# Patient Record
Sex: MALE | Race: WHITE | NOT HISPANIC OR LATINO | URBAN - METROPOLITAN AREA
[De-identification: names, ages, dates, MRNs, and addresses within clinical notes are randomized per-mention and may not be internally consistent; named-entity substitution may affect disease eponyms.]

---

## 2019-01-26 ENCOUNTER — EMERGENCY (EMERGENCY)
Facility: HOSPITAL | Age: 59
LOS: 1 days | Discharge: HOME | End: 2019-01-26
Attending: EMERGENCY MEDICINE
Payer: COMMERCIAL

## 2019-01-26 VITALS
TEMPERATURE: 98 F | SYSTOLIC BLOOD PRESSURE: 173 MMHG | DIASTOLIC BLOOD PRESSURE: 97 MMHG | WEIGHT: 225.09 LBS | HEIGHT: 70 IN | RESPIRATION RATE: 23 BRPM | HEART RATE: 76 BPM | OXYGEN SATURATION: 98 %

## 2019-01-26 LAB
ALBUMIN SERPL ELPH-MCNC: 4.8 G/DL — SIGNIFICANT CHANGE UP (ref 3.3–5.2)
ALP SERPL-CCNC: 98 U/L — SIGNIFICANT CHANGE UP (ref 40–120)
ALT FLD-CCNC: 52 U/L — HIGH
ANION GAP SERPL CALC-SCNC: 15 MMOL/L — SIGNIFICANT CHANGE UP (ref 5–17)
AST SERPL-CCNC: 31 U/L — SIGNIFICANT CHANGE UP
BASOPHILS # BLD AUTO: 0.1 K/UL — SIGNIFICANT CHANGE UP (ref 0–0.2)
BASOPHILS NFR BLD AUTO: 0.8 % — SIGNIFICANT CHANGE UP (ref 0–2)
BILIRUB SERPL-MCNC: 0.7 MG/DL — SIGNIFICANT CHANGE UP (ref 0.4–2)
BUN SERPL-MCNC: 23 MG/DL — HIGH (ref 8–20)
CALCIUM SERPL-MCNC: 9.8 MG/DL — SIGNIFICANT CHANGE UP (ref 8.6–10.2)
CHLORIDE SERPL-SCNC: 98 MMOL/L — SIGNIFICANT CHANGE UP (ref 98–107)
CO2 SERPL-SCNC: 27 MMOL/L — SIGNIFICANT CHANGE UP (ref 22–29)
CREAT SERPL-MCNC: 0.99 MG/DL — SIGNIFICANT CHANGE UP (ref 0.5–1.3)
EOSINOPHIL # BLD AUTO: 0.2 K/UL — SIGNIFICANT CHANGE UP (ref 0–0.5)
EOSINOPHIL NFR BLD AUTO: 1.6 % — SIGNIFICANT CHANGE UP (ref 0–5)
GLUCOSE SERPL-MCNC: 92 MG/DL — SIGNIFICANT CHANGE UP (ref 70–115)
HCT VFR BLD CALC: 48.1 % — SIGNIFICANT CHANGE UP (ref 42–52)
HGB BLD-MCNC: 16.5 G/DL — SIGNIFICANT CHANGE UP (ref 14–18)
LYMPHOCYTES # BLD AUTO: 3.7 K/UL — SIGNIFICANT CHANGE UP (ref 1–4.8)
LYMPHOCYTES # BLD AUTO: 39 % — SIGNIFICANT CHANGE UP (ref 20–55)
MCHC RBC-ENTMCNC: 30.8 PG — SIGNIFICANT CHANGE UP (ref 27–31)
MCHC RBC-ENTMCNC: 34.3 G/DL — SIGNIFICANT CHANGE UP (ref 32–36)
MCV RBC AUTO: 89.9 FL — SIGNIFICANT CHANGE UP (ref 80–94)
MONOCYTES # BLD AUTO: 0.9 K/UL — HIGH (ref 0–0.8)
MONOCYTES NFR BLD AUTO: 9.4 % — SIGNIFICANT CHANGE UP (ref 3–10)
NEUTROPHILS # BLD AUTO: 4.6 K/UL — SIGNIFICANT CHANGE UP (ref 1.8–8)
NEUTROPHILS NFR BLD AUTO: 49 % — SIGNIFICANT CHANGE UP (ref 37–73)
PLATELET # BLD AUTO: 278 K/UL — SIGNIFICANT CHANGE UP (ref 150–400)
POTASSIUM SERPL-MCNC: 3.9 MMOL/L — SIGNIFICANT CHANGE UP (ref 3.5–5.3)
POTASSIUM SERPL-SCNC: 3.9 MMOL/L — SIGNIFICANT CHANGE UP (ref 3.5–5.3)
PROT SERPL-MCNC: 8.3 G/DL — SIGNIFICANT CHANGE UP (ref 6.6–8.7)
RBC # BLD: 5.35 M/UL — SIGNIFICANT CHANGE UP (ref 4.6–6.2)
RBC # FLD: 13.2 % — SIGNIFICANT CHANGE UP (ref 11–15.6)
SODIUM SERPL-SCNC: 140 MMOL/L — SIGNIFICANT CHANGE UP (ref 135–145)
TROPONIN T SERPL-MCNC: <0.01 NG/ML — SIGNIFICANT CHANGE UP (ref 0–0.06)
WBC # BLD: 9.4 K/UL — SIGNIFICANT CHANGE UP (ref 4.8–10.8)
WBC # FLD AUTO: 9.4 K/UL — SIGNIFICANT CHANGE UP (ref 4.8–10.8)

## 2019-01-26 PROCEDURE — 99285 EMERGENCY DEPT VISIT HI MDM: CPT | Mod: 25

## 2019-01-26 PROCEDURE — 93010 ELECTROCARDIOGRAM REPORT: CPT

## 2019-01-26 PROCEDURE — 71046 X-RAY EXAM CHEST 2 VIEWS: CPT | Mod: 26

## 2019-01-26 RX ORDER — HYDRALAZINE HCL 50 MG
10 TABLET ORAL EVERY 6 HOURS
Qty: 0 | Refills: 0 | Status: DISCONTINUED | OUTPATIENT
Start: 2019-01-26 | End: 2019-01-31

## 2019-01-26 RX ORDER — LOSARTAN POTASSIUM 100 MG/1
100 TABLET, FILM COATED ORAL DAILY
Qty: 0 | Refills: 0 | Status: DISCONTINUED | OUTPATIENT
Start: 2019-01-26 | End: 2019-01-31

## 2019-01-26 RX ORDER — AMLODIPINE BESYLATE 2.5 MG/1
5 TABLET ORAL DAILY
Qty: 0 | Refills: 0 | Status: DISCONTINUED | OUTPATIENT
Start: 2019-01-26 | End: 2019-01-31

## 2019-01-26 RX ORDER — ATORVASTATIN CALCIUM 80 MG/1
20 TABLET, FILM COATED ORAL AT BEDTIME
Qty: 0 | Refills: 0 | Status: DISCONTINUED | OUTPATIENT
Start: 2019-01-26 | End: 2019-01-31

## 2019-01-26 RX ORDER — ASPIRIN/CALCIUM CARB/MAGNESIUM 324 MG
81 TABLET ORAL DAILY
Qty: 0 | Refills: 0 | Status: DISCONTINUED | OUTPATIENT
Start: 2019-01-26 | End: 2019-01-31

## 2019-01-26 RX ORDER — CYCLOBENZAPRINE HYDROCHLORIDE 10 MG/1
10 TABLET, FILM COATED ORAL ONCE
Qty: 0 | Refills: 0 | Status: COMPLETED | OUTPATIENT
Start: 2019-01-26 | End: 2019-01-26

## 2019-01-26 RX ORDER — KETOROLAC TROMETHAMINE 30 MG/ML
30 SYRINGE (ML) INJECTION ONCE
Qty: 0 | Refills: 0 | Status: DISCONTINUED | OUTPATIENT
Start: 2019-01-26 | End: 2019-01-26

## 2019-01-26 RX ADMIN — CYCLOBENZAPRINE HYDROCHLORIDE 10 MILLIGRAM(S): 10 TABLET, FILM COATED ORAL at 21:51

## 2019-01-26 RX ADMIN — Medication 30 MILLIGRAM(S): at 22:06

## 2019-01-26 RX ADMIN — Medication 30 MILLIGRAM(S): at 21:51

## 2019-01-26 NOTE — ED PROVIDER NOTE - NS ED ROS FT
no weight change, no fever +chills  no recent travel, no recent abox, no sick contacts  no recent change in medications  no rash, no bruises  no visual changes no eye discharge  no cough cold or congestion,   +sob, +chest pain  follows with cardiology  +stress test, no cath  no orthopnea, no pnd  no abd pain, no n/v/d  no endoscopy, +colonoscopy  no hematuria, no change in urinary habits  no joint pain, no deformity  no headache, no paresthesia

## 2019-01-26 NOTE — CONSULT NOTE ADULT - SUBJECTIVE AND OBJECTIVE BOX
Consult requested by: dr Mccormack	     Reason for Consultation: Chest Pain    History obtained by: Patient and medical record     obtained: No    Chief complaint: chest pain      HPI: 59 y/o male presents to the Ed with a c/o sudden onset of sharp 9/10 lower left chest wall pain that is positional, intermitting, and reproducible with palpitation and movement. He denies any history of this pain in the past. he admits to chills during the pain but denies SOB, nausea, sweats, fatigue. He came to the ED because his father had an AMI with PCI at this age. He is followed by Dr Carrillo cardiologist in [place]. His medical history is HTN, HLD and he states he had a negative stress test in April or May of 2018. He also believes his EKG was not normal.            REVIEW OF SYSTEMS:  CONSTITUTIONAL: No fever, weight loss, or fatigue  EYES: No eye pain, visual disturbances, or discharge  ENMT:  No difficulty hearing, tinnitus, vertigo; No sinus or throat pain  NECK: No pain or stiffness  BREASTS: No pain, masses, or nipple discharge  RESPIRATORY: No cough, wheezing, + chills No hemoptysis; No shortness of breath  CARDIOVASCULAR: + chest pain, No palpitations, dizziness, or leg swelling  GASTROINTESTINAL: No abdominal or epigastric pain. No nausea, vomiting, or hematemesis; No diarrhea or constipation. No melena or hematochezia.  GENITOURINARY: No dysuria, frequency, hematuria, or incontinence  NEUROLOGICAL: No headaches, memory loss, loss of strength, numbness, or tremors  SKIN: No itching, burning, rashes, or lesions   LYMPH NODES: No enlarged glands  ENDOCRINE: No heat or cold intolerance; No hair loss  MUSCULOSKELETAL: No joint pain or swelling; No muscle, back, or extremity pain  PSYCHIATRIC: No depression, anxiety, mood swings, or difficulty sleeping  HEME/LYMPH: No easy bruising, or bleeding gums  ALLERY AND IMMUNOLOGIC: No hives or eczema    MEDICATIONS  (STANDING):  Amlodipine 5 mg QD  aspirin 81 mg QD  Losartan 100 mg QD  Pravastatin 80 mg QD    MEDICATIONS  (PRN):        PAST MEDICAL & SURGICAL HISTORY:  High cholesterol  HTN (hypertension)      FAMILY HISTORY:  CAD with PCI ICD      SOCIAL HISTORY:  CIGARETTES: current smoker 6 cigars a month     ALCOHOL: 1-2 drinks daily   DRUGS: Denies    Vital Signs Last 24 Hrs  T(C): 36.4 (26 Jan 2019 19:30), Max: 36.4 (26 Jan 2019 19:30)  T(F): 97.6 (26 Jan 2019 19:30), Max: 97.6 (26 Jan 2019 19:30)  HR: 76 (26 Jan 2019 19:31) (76 - 76)  BP: 173/97 (26 Jan 2019 19:31) (173/97 - 173/97)  BP(mean): --  RR: 30 (26 Jan 2019 19:31) (23 - 30)  SpO2: 99% (26 Jan 2019 19:31) (98% - 99%)    PHYSICAL EXAM:  GENERAL: NAD, well-groomed, well-developed  HEAD:  Atraumatic, Normocephalic  EYES: EOMI, PERRLA, conjunctiva and sclera clear  ENMT: No tonsillar erythema, exudates, or enlargement; Moist mucous membranes, Good dentition, No lesions  NECK: Supple, No JVD, Normal thyroid  NERVOUS SYSTEM:  Alert & Oriented X3, Good concentration; Motor Strength 5/5 B/L upper and lower extremities;   CHEST/LUNG: Clear to percussion bilaterally; No rales, rhonchi, wheezing, or rubs, + tenderness left lower ribs mid clavicula line   HEART: Regular rate and rhythm; No murmurs, rubs, or gallops  ABDOMEN: Soft, Nontender, Nondistended; Bowel sounds present  EXTREMITIES:  2+ Peripheral Pulses, No clubbing, cyanosis, or edema  LYMPH: No lymphadenopathy noted  SKIN: No rashes or lesions        INTERPRETATION OF TELEMETRY:     ECG: NSR rate 71 T wave inversion V1, poor R wave in lead III, non-specific T wave changes no acute ischemic changes      I&O's Detail      LABS:                        16.5   9.4   )-----------( 278      ( 26 Jan 2019 19:48 )             48.1     01-26    140  |  98  |  23.0<H>  ----------------------------<  92  3.9   |  27.0  |  0.99    Ca    9.8      26 Jan 2019 19:48  Phos  3.9     01-26    TPro  8.3  /  Alb  4.8  /  TBili  0.7  /  DBili  x   /  AST  31  /  ALT  52<H>  /  AlkPhos  98  01-26    CARDIAC MARKERS ( 26 Jan 2019 19:48 )  Trop <0.01 ng/mL   U/L   CLMB 3.8 ng/mL          I&O's Summary      RADIOLOGY & ADDITIONAL STUDIES:  X-ray:  no acute process    PREVIOUS DIAGNOSTIC TESTING:  None available      ECHO  STRESS    CATHETERIZATION       Assessment / Plan: Consult requested by: dr Mccormack	     Reason for Consultation: Chest Pain    History obtained by: Patient and medical record     obtained: No    Chief complaint: chest pain      HPI: 59 y/o male presents to the Ed with a c/o sudden onset of sharp 9/10 lower left chest wall pain that is positional, intermitting, and reproducible with palpitation and movement. He denies any history of this pain in the past. he admits to chills during the pain but denies SOB, nausea, sweats, fatigue. He came to the ED because his father had an AMI with PCI at this age. He is followed by Dr Carrillo cardiologist in [place]. His medical history is HTN, HLD and he states he had a negative stress test in April or May of 2018. He also believes his EKG was not normal. Patient admits to a viral Upper respiratory infection last week.           REVIEW OF SYSTEMS:  CONSTITUTIONAL: No fever, weight loss, or fatigue  EYES: No eye pain, visual disturbances, or discharge  ENMT:  No difficulty hearing, tinnitus, vertigo; No sinus or throat pain  NECK: No pain or stiffness  BREASTS: No pain, masses, or nipple discharge  RESPIRATORY: No cough, wheezing, + chills No hemoptysis; No shortness of breath  CARDIOVASCULAR: + chest pain, No palpitations, dizziness, or leg swelling  GASTROINTESTINAL: No abdominal or epigastric pain. No nausea, vomiting, or hematemesis; No diarrhea or constipation. No melena or hematochezia.  GENITOURINARY: No dysuria, frequency, hematuria, or incontinence  NEUROLOGICAL: No headaches, memory loss, loss of strength, numbness, or tremors  SKIN: No itching, burning, rashes, or lesions   LYMPH NODES: No enlarged glands  ENDOCRINE: No heat or cold intolerance; No hair loss  MUSCULOSKELETAL: No joint pain or swelling; No muscle, back, or extremity pain  PSYCHIATRIC: No depression, anxiety, mood swings, or difficulty sleeping  HEME/LYMPH: No easy bruising, or bleeding gums  ALLERY AND IMMUNOLOGIC: No hives or eczema    MEDICATIONS  (STANDING):  Amlodipine 5 mg QD  aspirin 81 mg QD  Losartan 100 mg QD  Pravastatin 80 mg QD    MEDICATIONS  (PRN):        PAST MEDICAL & SURGICAL HISTORY:  High cholesterol  HTN (hypertension)      FAMILY HISTORY:  CAD with PCI ICD      SOCIAL HISTORY:  CIGARETTES: current smoker 6 cigars a month     ALCOHOL: 1-2 drinks daily   DRUGS: Denies    Vital Signs Last 24 Hrs  T(C): 36.4 (26 Jan 2019 19:30), Max: 36.4 (26 Jan 2019 19:30)  T(F): 97.6 (26 Jan 2019 19:30), Max: 97.6 (26 Jan 2019 19:30)  HR: 76 (26 Jan 2019 19:31) (76 - 76)  BP: 173/97 (26 Jan 2019 19:31) (173/97 - 173/97)  BP(mean): --  RR: 30 (26 Jan 2019 19:31) (23 - 30)  SpO2: 99% (26 Jan 2019 19:31) (98% - 99%)    PHYSICAL EXAM:  GENERAL: NAD, well-groomed, well-developed  HEAD:  Atraumatic, Normocephalic  EYES: EOMI, PERRLA, conjunctiva and sclera clear  ENMT: No tonsillar erythema, exudates, or enlargement; Moist mucous membranes, Good dentition, No lesions  NECK: Supple, No JVD, Normal thyroid  NERVOUS SYSTEM:  Alert & Oriented X3, Good concentration; Motor Strength 5/5 B/L upper and lower extremities;   CHEST/LUNG: Clear to percussion bilaterally; No rales, rhonchi, wheezing, or rubs, + tenderness left lower ribs mid clavicula line   HEART: Regular rate and rhythm; No murmurs, rubs, or gallops  ABDOMEN: Soft, Nontender, Nondistended; Bowel sounds present  EXTREMITIES:  2+ Peripheral Pulses, No clubbing, cyanosis, or edema  LYMPH: No lymphadenopathy noted  SKIN: No rashes or lesions        INTERPRETATION OF TELEMETRY:     ECG: NSR rate 71 T wave inversion V1, poor R wave in lead III, non-specific T wave changes no acute ischemic changes      I&O's Detail      LABS:                        16.5   9.4   )-----------( 278      ( 26 Jan 2019 19:48 )             48.1     01-26    140  |  98  |  23.0<H>  ----------------------------<  92  3.9   |  27.0  |  0.99    Ca    9.8      26 Jan 2019 19:48  Phos  3.9     01-26    TPro  8.3  /  Alb  4.8  /  TBili  0.7  /  DBili  x   /  AST  31  /  ALT  52<H>  /  AlkPhos  98  01-26    CARDIAC MARKERS ( 26 Jan 2019 19:48 )  Trop <0.01 ng/mL   U/L   CLMB 3.8 ng/mL          I&O's Summary      RADIOLOGY & ADDITIONAL STUDIES:  X-ray:  no acute process    PREVIOUS DIAGNOSTIC TESTING:  None available      ECHO  STRESS    CATHETERIZATION       Assessment / Plan:

## 2019-01-26 NOTE — ED PROVIDER NOTE - MEDICAL DECISION MAKING DETAILS
59 y/o M with hx of HLD and HTN presents with L anterior chest wall pain acute, plan to do labs, CXR, pain control and re-evaluate.

## 2019-01-26 NOTE — CONSULT NOTE ADULT - ATTENDING COMMENTS
57 y/o male with HTN, DLD, with Atypical CP followed by  Dr Carrillo cardiologist in [place].           1) Atypical chest pain: R/O AMI in setting of multiple risk factors, smoking, HLD, HTN, family Hx, ETOH use, abnormal EKG                                  serial CE and EKG, CE (-) x 3  currently CP free                                Recc: C/W outpatient meds f/u with Primary cardiologist for outpt stress /echo

## 2019-01-26 NOTE — ED CDU PROVIDER INITIAL DAY NOTE - MEDICAL DECISION MAKING DETAILS
57 y/o M with hx of HLD and HTN presents with L anterior chest wall pain acute, plan to do labs, CXR, pain control and re-evaluate.

## 2019-01-26 NOTE — ED CDU PROVIDER INITIAL DAY NOTE - PHYSICAL EXAMINATION
Constitutional: Appears uncomfortable with movement, talking in full sentences  Head: NC/AT, no swelling  Eyes: EOMI, no swelling  Mouth: mm moist  Neck: supple, trachea is midline  Chest: Bilateral air entry, symmetrical chest expansion, no distress, able to exaggerate pain between 6th and 7th rib at L mid clavicular line, no swelling or zoster noted  Heart: S1 S2 distant  Abdomen: abd soft, non tender  Musc/Skel: extremities with no swelling, no deformity, no spine tenderness, distal pulses present  Neuro: A&Ox3, no focal deficits

## 2019-01-26 NOTE — ED ADULT NURSE NOTE - OBJECTIVE STATEMENT
Patient A&Ox4 complaining of sudden onset of chest pain, left sided while eating dinner this evening. Denies any radiation, describes as tightness. Wife stated patient doubled over & started yelling, which she stated is not usual for him. Code team 1 at bedside.

## 2019-01-26 NOTE — ED ADULT TRIAGE NOTE - CHIEF COMPLAINT QUOTE
pt a+ox3, dropped off outside ambulance triage by wife stating "I am having a heart attack." pt clutching chest and tachypneic @ 30. pt brought to critical care, code team called to bedside.

## 2019-01-26 NOTE — ED PROVIDER NOTE - PHYSICAL EXAMINATION
Constitutional: Appears comfortably, talking in full sentences  Head: NC/AT, no swelling  Eyes: EOMI, no swelling  Mouth: mm moist  Neck: supple, trachea is midline  Chest: Bilateral air entry, symmetrical chest expansion, no distress  Heart: S1 S2 distant  Abdomen: abd soft, non tender  Musc/Skel: extremities with no swelling, no deformity, no spine tenderness, distal pulses present  Neuro: A&Ox3, no focal deficits Constitutional: Appears uncomfortable with movement, talking in full sentences  Head: NC/AT, no swelling  Eyes: EOMI, no swelling  Mouth: mm moist  Neck: supple, trachea is midline  Chest: Bilateral air entry, symmetrical chest expansion, no distress, able to exaggerate pain between 6th and 7th rib at L mid clavicular line, no swelling or zoster noted  Heart: S1 S2 distant  Abdomen: abd soft, non tender  Musc/Skel: extremities with no swelling, no deformity, no spine tenderness, distal pulses present  Neuro: A&Ox3, no focal deficits

## 2019-01-26 NOTE — ED ADULT NURSE NOTE - CAS EDN DISCHARGE ASSESSMENT
Alert and oriented to person, place and time/Awake/Symptoms improved/Dressing clean and dry/Patient baseline mental status

## 2019-01-26 NOTE — CONSULT NOTE ADULT - ASSESSMENT
Assessment / Plan:  59 y/o male presents to the Ed with a c/o sudden onset of sharp 9/10 lower left chest wall pain that is positional, intermitting, and reproducible with palpitation and movement. He denies any history of this pain in the past. he admits to chills during the pain but denies SOB, nausea, sweats, fatigue. He came to the ED because his father had an AMI with PCI at this age. He is followed by Dr Carrillo cardiologist in [place]. His medical history is HTN, HLD and he states he had a negative stress test in April or May of 2018. He also believes his EKG was not normal.            1) atypical chest pain: R/O AMI in setting of multiple risk factors, smoking, HLD, HTN, family Hx, ETOH use, abnormal EKG                                  serial CE and EKG, first CE negative                                  TTE in Am                                  C/W Aspirin 81 mg QD  2) HLD: C/W outpatient meds f/u with Primary cardiologist    3) HTN: not well controlled in the Ed Assessment / Plan:  57 y/o male presents to the Ed with a c/o sudden onset of sharp 9/10 lower left chest wall pain that is positional, intermitting, and reproducible with palpitation and movement. He denies any history of this pain in the past. he admits to chills during the pain but denies SOB, nausea, sweats, fatigue. He came to the ED because his father had an AMI with PCI at this age. He is followed by Dr Carrillo cardiologist in [place]. His medical history is HTN, HLD and he states he had a negative stress test in April or May of 2018. He also believes his EKG was not normal.            1) atypical chest pain: R/O AMI in setting of multiple risk factors, smoking, HLD, HTN, family Hx, ETOH use, abnormal EKG                                  serial CE and EKG, first CE negative                                  TTE in Am                                  C/W Aspirin 81 mg QD  2) HLD: C/W outpatient meds f/u with Primary cardiologist    3) HTN: not well controlled in the Ed c/w out patient meds and monitor BP                                    Hydralazine 10 mg IVP PRN SBP > 160

## 2019-01-26 NOTE — ED ADULT NURSE NOTE - NSIMPLEMENTINTERV_GEN_ALL_ED
Implemented All Universal Safety Interventions:  [place] to call system. Call bell, personal items and telephone within reach. Instruct patient to call for assistance. Room bathroom lighting operational. Non-slip footwear when patient is off stretcher. Physically safe environment: no spills, clutter or unnecessary equipment. Stretcher in lowest position, wheels locked, appropriate side rails in place.

## 2019-01-26 NOTE — ED PROVIDER NOTE - OBJECTIVE STATEMENT
57 y/o M hx of HTN, HLD presents to the ED c/o constant chest pain onset today. Pt states sx began after finishing dinner this evening, described as tightness and exacerbated with certain movements and coughing. When pain is present, pt feels short of breath and chills. Pt has never experienced similar episode of sx in the past. Pt is compliant with all medications. Denies fever, chills, nausea/vomiting. Pt works as strong and . Negative stress test in the past. FHx of father with MI at 55 y/o.   PMD is Dr. Montalvo, Cardiologist is Dr. Carrillo

## 2019-01-26 NOTE — ED CDU PROVIDER INITIAL DAY NOTE - PROGRESS NOTE DETAILS
PA Note: PT evaluated by physician. HPI/PE/ROS as noted above. Will follow up plan per physician and cardiology consult.  Pt asymptomatic at this time, pt reports CP improved after administration of Flexeril and Toradol. Will repeat Troponin/EKG and TTE in the AM.

## 2019-01-27 VITALS
OXYGEN SATURATION: 95 % | HEART RATE: 60 BPM | TEMPERATURE: 98 F | SYSTOLIC BLOOD PRESSURE: 125 MMHG | RESPIRATION RATE: 17 BRPM | DIASTOLIC BLOOD PRESSURE: 79 MMHG

## 2019-01-27 LAB
CK SERPL-CCNC: 127 U/L — SIGNIFICANT CHANGE UP (ref 30–200)
PHOSPHATE SERPL-MCNC: 4.6 MG/DL — SIGNIFICANT CHANGE UP (ref 2.4–4.7)
TROPONIN T SERPL-MCNC: <0.01 NG/ML — SIGNIFICANT CHANGE UP (ref 0–0.06)

## 2019-01-27 PROCEDURE — 84100 ASSAY OF PHOSPHORUS: CPT

## 2019-01-27 PROCEDURE — 71046 X-RAY EXAM CHEST 2 VIEWS: CPT

## 2019-01-27 PROCEDURE — 93010 ELECTROCARDIOGRAM REPORT: CPT | Mod: 76

## 2019-01-27 PROCEDURE — 93005 ELECTROCARDIOGRAM TRACING: CPT

## 2019-01-27 PROCEDURE — 96374 THER/PROPH/DIAG INJ IV PUSH: CPT

## 2019-01-27 PROCEDURE — 99220: CPT

## 2019-01-27 PROCEDURE — 82550 ASSAY OF CK (CPK): CPT

## 2019-01-27 PROCEDURE — 99284 EMERGENCY DEPT VISIT MOD MDM: CPT | Mod: 25

## 2019-01-27 PROCEDURE — 84484 ASSAY OF TROPONIN QUANT: CPT

## 2019-01-27 PROCEDURE — 85027 COMPLETE CBC AUTOMATED: CPT

## 2019-01-27 PROCEDURE — 82553 CREATINE MB FRACTION: CPT

## 2019-01-27 PROCEDURE — G0378: CPT

## 2019-01-27 PROCEDURE — 36415 COLL VENOUS BLD VENIPUNCTURE: CPT

## 2019-01-27 PROCEDURE — 80053 COMPREHEN METABOLIC PANEL: CPT

## 2019-01-27 RX ADMIN — Medication 81 MILLIGRAM(S): at 07:42

## 2019-01-27 RX ADMIN — AMLODIPINE BESYLATE 5 MILLIGRAM(S): 2.5 TABLET ORAL at 07:42

## 2019-01-27 RX ADMIN — LOSARTAN POTASSIUM 100 MILLIGRAM(S): 100 TABLET, FILM COATED ORAL at 07:42

## 2019-01-27 NOTE — ED CDU PROVIDER DISPOSITION NOTE - CARE PROVIDER_API CALL
Agustin Walker (MD; MPH), Cardiac Electrophysiology; Cardiovascular Disease; Internal Medicine  [address] [place] [address] [id number]  Phone: [phone]  Fax: [phone]

## 2019-01-27 NOTE — ED CDU PROVIDER SUBSEQUENT DAY NOTE - HISTORY
59yo male with hx of HTN and HLD presents for chest pain. Pt has no complaints at this time, pt reports chest pain has subsided after medication administration. Pt denies fever/chills, nausea/vomiting, abdominal pain. 59 y/o M hx of HTN, HLD presents to the ED c/o constant chest pain onset today. Pt states sx began after finishing dinner this evening, described as tightness and exacerbated with certain movements and coughing. When pain is present, pt feels short of breath and chills. Pt has never experienced similar episode of sx in the past. Pt is compliant with all medications. Denies fever, chills, nausea/vomiting. Pt works as strong and . Negative stress test in the past. FHx of father with MI at 57 y/o.

## 2019-01-27 NOTE — ED CDU PROVIDER DISPOSITION NOTE - ATTENDING CONTRIBUTION TO CARE
Patient seen in obs  asymptomatic  cleared by cardiology  no need for NIRMALA  patient will be discharged form obs  Agree with assessment hx and physical

## 2019-01-27 NOTE — ED ADULT NURSE REASSESSMENT NOTE - NS ED NURSE REASSESS COMMENT FT1
Pt alert and oriented. resting in stretcher, no signs of distress noted. Handoff report given to Sevne Recio RN and pt's safety maintained. RN with no questions or concerns at this time
Purposeful rounding completed on patient. pt resting in stretcher in stretcher with no complaints of chest pain, SOB or dizziness.  VSS and documented as per flow sheet. plan care of care reviewed. Bed in lowest position, call bell within reach, and safety maintained. monitoring on-going for any changes.
assumed pt care from previous Rn Rosaura Lindo.  pt transported to CDU-10 for observation. pt  A&Ox3;  resting in stretcher, with no complaints of pain or discomfort. B/L lungs clear, normal s1&s2 heard on ausculation. (+) pedal pulses, skin warm/dry intact. IV patent to right Antecubital. VSS and documented as per flow sheet. plan of care reviewed and pt verbalizes understanding. bed in lowest position, call bell within reach and safety maintained. monitoring ongoing for any changes.
verbal report rec'd from CDU JORGE LUIS Luna, assumed care of pt, no obvious distress noted
Report given to Obs RN kmeal Montgomery transported to CDU10 and placed on telebox with  by this RN. Safety maintained, call bell in reach, Receiving rn bedside

## 2019-01-27 NOTE — ED CDU PROVIDER DISPOSITION NOTE - CARE PROVIDERS DIRECT ADDRESSES
[email or web address]

## 2019-01-27 NOTE — ED CDU PROVIDER DISPOSITION NOTE - CLINICAL COURSE
Patient with CP x 1 day underneath breast reproducible described it initially as stabbing, with risk factors HTN, HLDA, smoking, reports "100% improved", during ED obs course had troponins x 3 negative, EKG shows sinus wilma.  Patient has outpatient cardiologist that he follows up with annually MD Severino states had outpatient echo and stress test done within the year that were "normal."  Cardiac evaluation appreciated, to f/u with cardiologist as outpatient no indication for NIRMALA, cancelled studied as per MD Agustin Walker.

## 2019-01-27 NOTE — ED CDU PROVIDER SUBSEQUENT DAY NOTE - MEDICAL DECISION MAKING DETAILS
59yo male comes in complaining of chest pain. Per cardiology consult, serial troponins/EKG. TTE scheduled for the AM.

## 2019-01-28 RX ORDER — LOSARTAN POTASSIUM 100 MG/1
0 TABLET, FILM COATED ORAL
Qty: 0 | Refills: 0 | COMMUNITY

## 2019-01-28 RX ORDER — AMLODIPINE BESYLATE 2.5 MG/1
1 TABLET ORAL
Qty: 0 | Refills: 0 | COMMUNITY

## 2019-01-28 RX ORDER — AMLODIPINE BESYLATE 2.5 MG/1
0 TABLET ORAL
Qty: 0 | Refills: 0 | COMMUNITY

## 2019-01-28 RX ORDER — LOSARTAN POTASSIUM 100 MG/1
1 TABLET, FILM COATED ORAL
Qty: 0 | Refills: 0 | COMMUNITY

## 2019-01-28 RX ORDER — ASPIRIN/CALCIUM CARB/MAGNESIUM 324 MG
1 TABLET ORAL
Qty: 0 | Refills: 0 | COMMUNITY

## 2019-01-28 NOTE — ED POST DISCHARGE NOTE - DETAILS
Left message Spoke to pt, informed pt of his CXR results, pt requesting report be faxed to Dr. Ty Vázquez MD. Called office for fax #, [phone]

## 2020-01-15 PROBLEM — Z00.00 ENCOUNTER FOR PREVENTIVE HEALTH EXAMINATION: Status: ACTIVE | Noted: 2020-01-15

## 2020-01-16 PROBLEM — I10 ESSENTIAL (PRIMARY) HYPERTENSION: Chronic | Status: ACTIVE | Noted: 2019-01-26

## 2020-01-16 PROBLEM — E78.00 PURE HYPERCHOLESTEROLEMIA, UNSPECIFIED: Chronic | Status: ACTIVE | Noted: 2019-01-26

## 2020-04-08 ENCOUNTER — APPOINTMENT (OUTPATIENT)
Dept: INTERNAL MEDICINE | Facility: CLINIC | Age: 60
End: 2020-04-08

## 2020-04-15 ENCOUNTER — APPOINTMENT (OUTPATIENT)
Dept: INTERNAL MEDICINE | Facility: CLINIC | Age: 60
End: 2020-04-15
Payer: COMMERCIAL

## 2020-04-15 ENCOUNTER — NON-APPOINTMENT (OUTPATIENT)
Age: 60
End: 2020-04-15

## 2020-04-15 ENCOUNTER — LABORATORY RESULT (OUTPATIENT)
Age: 60
End: 2020-04-15

## 2020-04-15 VITALS
BODY MASS INDEX: 33.21 KG/M2 | TEMPERATURE: 98.4 F | HEIGHT: 70 IN | WEIGHT: 232 LBS | SYSTOLIC BLOOD PRESSURE: 122 MMHG | DIASTOLIC BLOOD PRESSURE: 80 MMHG | OXYGEN SATURATION: 98 % | HEART RATE: 74 BPM

## 2020-04-15 DIAGNOSIS — Z78.9 OTHER SPECIFIED HEALTH STATUS: ICD-10-CM

## 2020-04-15 DIAGNOSIS — F10.10 ALCOHOL ABUSE, UNCOMPLICATED: ICD-10-CM

## 2020-04-15 DIAGNOSIS — Z82.49 FAMILY HISTORY OF ISCHEMIC HEART DISEASE AND OTHER DISEASES OF THE CIRCULATORY SYSTEM: ICD-10-CM

## 2020-04-15 PROCEDURE — 36415 COLL VENOUS BLD VENIPUNCTURE: CPT

## 2020-04-15 PROCEDURE — 93000 ELECTROCARDIOGRAM COMPLETE: CPT

## 2020-04-15 PROCEDURE — 82043 UR ALBUMIN QUANTITATIVE: CPT | Mod: QW

## 2020-04-15 PROCEDURE — 99204 OFFICE O/P NEW MOD 45 MIN: CPT | Mod: 25

## 2020-04-15 RX ORDER — LOSARTAN POTASSIUM 100 MG/1
100 TABLET, FILM COATED ORAL
Qty: 90 | Refills: 0 | Status: ACTIVE | COMMUNITY
Start: 2020-03-21

## 2020-04-15 RX ORDER — PRAVASTATIN SODIUM 80 MG/1
80 TABLET ORAL
Qty: 90 | Refills: 0 | Status: ACTIVE | COMMUNITY
Start: 2019-06-14

## 2020-04-15 RX ORDER — HYDROCHLOROTHIAZIDE 12.5 MG/1
12.5 CAPSULE ORAL DAILY
Qty: 30 | Refills: 2 | Status: ACTIVE | COMMUNITY
Start: 2020-04-15 | End: 1900-01-01

## 2020-04-15 RX ORDER — AMLODIPINE BESYLATE 5 MG/1
5 TABLET ORAL
Qty: 90 | Refills: 0 | Status: ACTIVE | COMMUNITY
Start: 2019-06-14

## 2020-04-17 LAB
24R-OH-CALCIDIOL SERPL-MCNC: 27.5 PG/ML
ALBUMIN SERPL ELPH-MCNC: 4.6 G/DL
ALBUMIN: 150
ALP BLD-CCNC: 89 U/L
ALT SERPL-CCNC: 44 U/L
ANION GAP SERPL CALC-SCNC: 16 MMOL/L
APPEARANCE: CLEAR
AST SERPL-CCNC: 33 U/L
BASOPHILS # BLD AUTO: 0.08 K/UL
BASOPHILS NFR BLD AUTO: 1.1 %
BILIRUB SERPL-MCNC: 1.8 MG/DL
BILIRUBIN URINE: NEGATIVE
BLOOD URINE: NORMAL
BUN SERPL-MCNC: 22 MG/DL
CALCIUM SERPL-MCNC: 10.4 MG/DL
CHLORIDE SERPL-SCNC: 96 MMOL/L
CHOLEST SERPL-MCNC: 185 MG/DL
CHOLEST/HDLC SERPL: 3.3 RATIO
CK SERPL-CCNC: 218 U/L
CO2 SERPL-SCNC: 27 MMOL/L
COLOR: YELLOW
CREAT SERPL-MCNC: 1.24 MG/DL
CREATININE: 300
EOSINOPHIL # BLD AUTO: 0.08 K/UL
EOSINOPHIL NFR BLD AUTO: 1.1 %
ESTIMATED AVERAGE GLUCOSE: 111 MG/DL
GLUCOSE QUALITATIVE U: NEGATIVE
GLUCOSE SERPL-MCNC: 89 MG/DL
HBA1C MFR BLD HPLC: 5.5 %
HCT VFR BLD CALC: 49.9 %
HCV AB SER QL: NONREACTIVE
HCV S/CO RATIO: 0.29 S/CO
HDLC SERPL-MCNC: 57 MG/DL
HGB BLD-MCNC: 16.6 G/DL
HIV1+2 AB SPEC QL IA.RAPID: NONREACTIVE
IMM GRANULOCYTES NFR BLD AUTO: 0.3 %
KETONES URINE: ABNORMAL
LDLC SERPL CALC-MCNC: 111 MG/DL
LEUKOCYTE ESTERASE URINE: NEGATIVE
LYMPHOCYTES # BLD AUTO: 2.5 K/UL
LYMPHOCYTES NFR BLD AUTO: 35.4 %
MAN DIFF?: NORMAL
MCHC RBC-ENTMCNC: 30.8 PG
MCHC RBC-ENTMCNC: 33.3 GM/DL
MCV RBC AUTO: 92.6 FL
MICROALBUMIN/CREAT UR TEST STR-RTO: NORMAL
MONOCYTES # BLD AUTO: 0.66 K/UL
MONOCYTES NFR BLD AUTO: 9.3 %
NEUTROPHILS # BLD AUTO: 3.73 K/UL
NEUTROPHILS NFR BLD AUTO: 52.8 %
NITRITE URINE: NEGATIVE
PH URINE: 5.5
PLATELET # BLD AUTO: 314 K/UL
POTASSIUM SERPL-SCNC: 4.6 MMOL/L
PROT SERPL-MCNC: 7.5 G/DL
PROTEIN URINE: ABNORMAL
RBC # BLD: 5.39 M/UL
RBC # FLD: 13.3 %
SODIUM SERPL-SCNC: 138 MMOL/L
SPECIFIC GRAVITY URINE: 1.03
TRIGL SERPL-MCNC: 90 MG/DL
TSH SERPL-ACNC: 3.16 UIU/ML
URATE SERPL-MCNC: 5.9 MG/DL
UROBILINOGEN URINE: NORMAL
WBC # FLD AUTO: 7.07 K/UL

## 2020-06-15 ENCOUNTER — APPOINTMENT (OUTPATIENT)
Dept: INTERNAL MEDICINE | Facility: CLINIC | Age: 60
End: 2020-06-15
Payer: COMMERCIAL

## 2020-06-15 VITALS
WEIGHT: 234 LBS | DIASTOLIC BLOOD PRESSURE: 80 MMHG | TEMPERATURE: 98.1 F | SYSTOLIC BLOOD PRESSURE: 120 MMHG | BODY MASS INDEX: 33.5 KG/M2 | HEIGHT: 70 IN

## 2020-06-15 PROCEDURE — 99214 OFFICE O/P EST MOD 30 MIN: CPT

## 2020-07-15 ENCOUNTER — APPOINTMENT (OUTPATIENT)
Dept: INTERNAL MEDICINE | Facility: CLINIC | Age: 60
End: 2020-07-15
Payer: COMMERCIAL

## 2020-07-15 VITALS
BODY MASS INDEX: 33.79 KG/M2 | WEIGHT: 236 LBS | HEIGHT: 70 IN | SYSTOLIC BLOOD PRESSURE: 130 MMHG | DIASTOLIC BLOOD PRESSURE: 76 MMHG | OXYGEN SATURATION: 98 % | HEART RATE: 63 BPM | TEMPERATURE: 97.7 F

## 2020-07-15 DIAGNOSIS — Z11.59 ENCOUNTER FOR SCREENING FOR OTHER VIRAL DISEASES: ICD-10-CM

## 2020-07-15 DIAGNOSIS — R31.9 HEMATURIA, UNSPECIFIED: ICD-10-CM

## 2020-07-15 PROCEDURE — 99214 OFFICE O/P EST MOD 30 MIN: CPT | Mod: 25

## 2020-07-15 PROCEDURE — 36415 COLL VENOUS BLD VENIPUNCTURE: CPT

## 2020-07-19 LAB
SARS-COV-2 IGG SERPL IA-ACNC: 0.01 INDEX
SARS-COV-2 IGG SERPL QL IA: NEGATIVE

## 2020-07-29 ENCOUNTER — APPOINTMENT (OUTPATIENT)
Dept: INTERNAL MEDICINE | Facility: CLINIC | Age: 60
End: 2020-07-29

## 2020-08-23 ENCOUNTER — TRANSCRIPTION ENCOUNTER (OUTPATIENT)
Age: 60
End: 2020-08-23

## 2020-11-03 ENCOUNTER — APPOINTMENT (OUTPATIENT)
Dept: INTERNAL MEDICINE | Facility: CLINIC | Age: 60
End: 2020-11-03
Payer: COMMERCIAL

## 2020-11-03 VITALS
TEMPERATURE: 98.2 F | SYSTOLIC BLOOD PRESSURE: 130 MMHG | WEIGHT: 240 LBS | OXYGEN SATURATION: 98 % | HEIGHT: 70 IN | HEART RATE: 70 BPM | BODY MASS INDEX: 34.36 KG/M2 | DIASTOLIC BLOOD PRESSURE: 80 MMHG

## 2020-11-03 DIAGNOSIS — Z23 ENCOUNTER FOR IMMUNIZATION: ICD-10-CM

## 2020-11-03 PROCEDURE — 99214 OFFICE O/P EST MOD 30 MIN: CPT | Mod: 25

## 2020-11-03 PROCEDURE — G0008: CPT

## 2020-11-03 PROCEDURE — 99072 ADDL SUPL MATRL&STAF TM PHE: CPT

## 2020-11-03 PROCEDURE — 90686 IIV4 VACC NO PRSV 0.5 ML IM: CPT

## 2020-11-03 NOTE — PHYSICAL EXAM
[No Acute Distress] : no acute distress [Well Nourished] : well nourished [Normal Sclera/Conjunctiva] : normal sclera/conjunctiva [Normal Outer Ear/Nose] : the outer ears and nose were normal in appearance [No JVD] : no jugular venous distention [No Respiratory Distress] : no respiratory distress  [Normal Rate] : normal rate  [Alert and Oriented x3] : oriented to person, place, and time

## 2020-11-03 NOTE — ASSESSMENT
[FreeTextEntry1] : The BP is well controlled and the edema is controlled in his feet. The vertigo has resolved and he reports that it did not comre bvack.

## 2020-11-03 NOTE — HISTORY OF PRESENT ILLNESS
[FreeTextEntry1] : f/u to vertigo,HTN, and  minimal swelling of the ankles. [de-identified] : The vertigo has resolved and he is not having any other problems  the BP is stable and the peripheral edema is well controlled. The patient reports that he is feeling very good.

## 2020-12-17 NOTE — ED ADULT TRIAGE NOTE - CADM TRG TX PRIOR TO ARRIVAL
[unfilled]    2020    Yasmeen Alexandra  : 1947  MRN: [id number]      Coumadin Clinic Assessment Sheet    Reason for anticoagulation : Bilateral DVT, Pulmonary Embolus    INR Goal: 2-3    Duration: Long-term    Primary Doctor's Name: Dr Eugene    Current dose as stated by the patient: 7.5 mg Monday & Wednesday, 5 mg ROW (40 mg/wk)    Current dose and documented dose the same:Yes    Has patient missed any doses?    No    Is coumadin presently on hold for a test or procedure?   No    Has patient had any new medications including antibiotics since the last INR was done?  No    Has patient had any changes in diet since the last INR done?  No, vitamin K goal-2 cups/wk    Assessment:  High Risk Medications: None  Significant Findings: None  Pertinent History: None  Other: New warfarin patient. Pt was previously on Eliquis  and is transitioning to warfarin due to cost. Pt started taking warfarin 5 mg on 12-10-20    Plan (5 mg tablets)  INR FINGERSTICK: 2.6  The INR for today is therapeutic based on the INR goal of 2-3.   Recommended dose: 7.5 mg on Monday & Wednesday, 5 mg ROW (40 mg/wk)  Follow-Up: 4 days, 20    Pt given written and verbal instructions. Pt verbalized understanding & agreement with the plan    
none

## 2021-02-03 ENCOUNTER — APPOINTMENT (OUTPATIENT)
Dept: INTERNAL MEDICINE | Facility: CLINIC | Age: 61
End: 2021-02-03
Payer: COMMERCIAL

## 2021-02-03 VITALS
BODY MASS INDEX: 34.93 KG/M2 | WEIGHT: 244 LBS | OXYGEN SATURATION: 98 % | HEIGHT: 70 IN | HEART RATE: 80 BPM | DIASTOLIC BLOOD PRESSURE: 80 MMHG | SYSTOLIC BLOOD PRESSURE: 132 MMHG | TEMPERATURE: 98.2 F

## 2021-02-03 DIAGNOSIS — R42 DIZZINESS AND GIDDINESS: ICD-10-CM

## 2021-02-03 PROCEDURE — 99072 ADDL SUPL MATRL&STAF TM PHE: CPT

## 2021-02-03 PROCEDURE — 99214 OFFICE O/P EST MOD 30 MIN: CPT

## 2021-04-20 ENCOUNTER — APPOINTMENT (OUTPATIENT)
Dept: INTERNAL MEDICINE | Facility: CLINIC | Age: 61
End: 2021-04-20

## 2021-06-07 ENCOUNTER — APPOINTMENT (OUTPATIENT)
Dept: INTERNAL MEDICINE | Facility: CLINIC | Age: 61
End: 2021-06-07
Payer: COMMERCIAL

## 2021-06-07 ENCOUNTER — NON-APPOINTMENT (OUTPATIENT)
Age: 61
End: 2021-06-07

## 2021-06-07 ENCOUNTER — LABORATORY RESULT (OUTPATIENT)
Age: 61
End: 2021-06-07

## 2021-06-07 VITALS
OXYGEN SATURATION: 97 % | WEIGHT: 233 LBS | HEIGHT: 70 IN | HEART RATE: 76 BPM | DIASTOLIC BLOOD PRESSURE: 82 MMHG | TEMPERATURE: 98.6 F | BODY MASS INDEX: 33.36 KG/M2 | SYSTOLIC BLOOD PRESSURE: 110 MMHG

## 2021-06-07 DIAGNOSIS — E78.49 OTHER HYPERLIPIDEMIA: ICD-10-CM

## 2021-06-07 PROCEDURE — 82043 UR ALBUMIN QUANTITATIVE: CPT | Mod: QW

## 2021-06-07 PROCEDURE — 99072 ADDL SUPL MATRL&STAF TM PHE: CPT

## 2021-06-07 PROCEDURE — 36415 COLL VENOUS BLD VENIPUNCTURE: CPT

## 2021-06-07 PROCEDURE — 99396 PREV VISIT EST AGE 40-64: CPT | Mod: 25

## 2021-06-07 PROCEDURE — 93000 ELECTROCARDIOGRAM COMPLETE: CPT

## 2021-06-08 LAB
24R-OH-CALCIDIOL SERPL-MCNC: 51.6 PG/ML
ALBUMIN SERPL ELPH-MCNC: 4.4 G/DL
ALBUMIN: 150
ALP BLD-CCNC: 89 U/L
ALT SERPL-CCNC: 37 U/L
ANION GAP SERPL CALC-SCNC: 12 MMOL/L
APPEARANCE: CLEAR
AST SERPL-CCNC: 29 U/L
BASOPHILS # BLD AUTO: 0.07 K/UL
BASOPHILS NFR BLD AUTO: 1.1 %
BILIRUB SERPL-MCNC: 1.4 MG/DL
BILIRUBIN URINE: NEGATIVE
BLOOD URINE: NEGATIVE
BUN SERPL-MCNC: 16 MG/DL
CALCIUM SERPL-MCNC: 9.7 MG/DL
CHLORIDE SERPL-SCNC: 102 MMOL/L
CHOLEST SERPL-MCNC: 174 MG/DL
CK SERPL-CCNC: 157 U/L
CO2 SERPL-SCNC: 25 MMOL/L
COLOR: NORMAL
CREAT SERPL-MCNC: 1.11 MG/DL
CREATININE: 100
EOSINOPHIL # BLD AUTO: 0.05 K/UL
EOSINOPHIL NFR BLD AUTO: 0.8 %
ESTIMATED AVERAGE GLUCOSE: 114 MG/DL
GLUCOSE QUALITATIVE U: NEGATIVE
GLUCOSE SERPL-MCNC: 103 MG/DL
HBA1C MFR BLD HPLC: 5.6 %
HCT VFR BLD CALC: 46.9 %
HDLC SERPL-MCNC: 51 MG/DL
HGB BLD-MCNC: 15.7 G/DL
IMM GRANULOCYTES NFR BLD AUTO: 0.3 %
KETONES URINE: NEGATIVE
LDLC SERPL CALC-MCNC: 102 MG/DL
LEUKOCYTE ESTERASE URINE: NEGATIVE
LYMPHOCYTES # BLD AUTO: 1.77 K/UL
LYMPHOCYTES NFR BLD AUTO: 28.6 %
MAN DIFF?: NORMAL
MCHC RBC-ENTMCNC: 30.5 PG
MCHC RBC-ENTMCNC: 33.5 GM/DL
MCV RBC AUTO: 91.2 FL
MICROALBUMIN/CREAT UR TEST STR-RTO: >300
MONOCYTES # BLD AUTO: 0.65 K/UL
MONOCYTES NFR BLD AUTO: 10.5 %
NEUTROPHILS # BLD AUTO: 3.62 K/UL
NEUTROPHILS NFR BLD AUTO: 58.7 %
NITRITE URINE: NEGATIVE
NONHDLC SERPL-MCNC: 123 MG/DL
PH URINE: 7.5
PLATELET # BLD AUTO: 275 K/UL
POTASSIUM SERPL-SCNC: 4.3 MMOL/L
PROT SERPL-MCNC: 7.2 G/DL
PROTEIN URINE: ABNORMAL
RBC # BLD: 5.14 M/UL
RBC # FLD: 13.4 %
SODIUM SERPL-SCNC: 138 MMOL/L
SPECIFIC GRAVITY URINE: 1.02
TRIGL SERPL-MCNC: 107 MG/DL
TSH SERPL-ACNC: 2.79 UIU/ML
URATE SERPL-MCNC: 5.4 MG/DL
UROBILINOGEN URINE: NORMAL
WBC # FLD AUTO: 6.18 K/UL

## 2021-09-04 ENCOUNTER — TRANSCRIPTION ENCOUNTER (OUTPATIENT)
Age: 61
End: 2021-09-04

## 2021-09-14 ENCOUNTER — APPOINTMENT (OUTPATIENT)
Dept: INTERNAL MEDICINE | Facility: CLINIC | Age: 61
End: 2021-09-14
Payer: COMMERCIAL

## 2021-09-14 VITALS
BODY MASS INDEX: 33.93 KG/M2 | TEMPERATURE: 97.8 F | HEART RATE: 66 BPM | DIASTOLIC BLOOD PRESSURE: 80 MMHG | HEIGHT: 70 IN | WEIGHT: 237 LBS | OXYGEN SATURATION: 97 % | SYSTOLIC BLOOD PRESSURE: 134 MMHG

## 2021-09-14 DIAGNOSIS — H83.09 LABYRINTHITIS, UNSPECIFIED EAR: ICD-10-CM

## 2021-09-14 DIAGNOSIS — I10 ESSENTIAL (PRIMARY) HYPERTENSION: ICD-10-CM

## 2021-09-14 DIAGNOSIS — R60.9 EDEMA, UNSPECIFIED: ICD-10-CM

## 2021-09-14 PROCEDURE — 99214 OFFICE O/P EST MOD 30 MIN: CPT

## 2022-03-01 ENCOUNTER — NON-APPOINTMENT (OUTPATIENT)
Age: 62
End: 2022-03-01

## 2022-03-01 ENCOUNTER — APPOINTMENT (OUTPATIENT)
Dept: INTERNAL MEDICINE | Facility: CLINIC | Age: 62
End: 2022-03-01
Payer: COMMERCIAL

## 2022-03-01 VITALS
WEIGHT: 241 LBS | HEIGHT: 70 IN | DIASTOLIC BLOOD PRESSURE: 83 MMHG | TEMPERATURE: 97.9 F | RESPIRATION RATE: 15 BRPM | OXYGEN SATURATION: 97 % | SYSTOLIC BLOOD PRESSURE: 141 MMHG | BODY MASS INDEX: 34.5 KG/M2 | HEART RATE: 77 BPM

## 2022-03-01 DIAGNOSIS — K21.9 GASTRO-ESOPHAGEAL REFLUX DISEASE W/OUT ESOPHAGITIS: ICD-10-CM

## 2022-03-01 DIAGNOSIS — R13.12 DYSPHAGIA, OROPHARYNGEAL PHASE: ICD-10-CM

## 2022-03-01 PROCEDURE — 99214 OFFICE O/P EST MOD 30 MIN: CPT | Mod: 25

## 2022-03-01 PROCEDURE — 93000 ELECTROCARDIOGRAM COMPLETE: CPT

## 2022-03-01 RX ORDER — PANTOPRAZOLE 20 MG/1
20 TABLET, DELAYED RELEASE ORAL DAILY
Qty: 30 | Refills: 0 | Status: ACTIVE | COMMUNITY
Start: 2022-03-01 | End: 1900-01-01

## 2022-03-01 NOTE — HISTORY OF PRESENT ILLNESS
[FreeTextEntry1] : Trouble swallowing  [de-identified] : Ulises is a 60 yo M w PMHx HTN, HLD \par C/O for the past couple of months he will have pain in his epigastric region/middle of chest when he eats. Sometimes pain is so bad he has to stop eating and he has to drink water in order to help with the pain. He has the need to cut his food into small pieces in order to prevent this from happening. Feels like the food is getting stuck in there. \par Sometimes the pain will wake him up at night, he wakes up, drinks water and it resolves. If he takes tums or mylanta this will not happen at night. \par Patient follows up with cardiologist q6 months, last time

## 2022-03-01 NOTE — PHYSICAL EXAM
[Normal Oropharynx] : the oropharynx was normal [No Edema] : there was no peripheral edema [Coordination Grossly Intact] : coordination grossly intact [No Focal Deficits] : no focal deficits [Normal Gait] : normal gait [Normal] : affect was normal and insight and judgment were intact

## 2022-03-01 NOTE — REVIEW OF SYSTEMS
[Abdominal Pain] : abdominal pain [Heartburn] : heartburn [Negative] : Psychiatric [Chest Pain] : no chest pain [Palpitations] : no palpitations [Claudication] : no  leg claudication [Lower Ext Edema] : no lower extremity edema [Orthopena] : no orthopnea [Paroxysmal Nocturnal Dyspnea] : no paroxysmal nocturnal dyspnea [Nausea] : no nausea [Constipation] : no constipation [Diarrhea] : no diarrhea [Vomiting] : no vomiting [Melena] : no melena

## 2022-03-01 NOTE — ASSESSMENT
[FreeTextEntry1] : Ulises is a 60 yo M w PMHx HTN, HLD \par C/O for the past couple of months he will have pain in his epigastric region/middle of chest when he eats. Sometimes pain is so bad he has to stop eating and he has to drink water in order to help with the pain. He has the need to cut his food into small pieces in order to prevent this from happening. Feels like the food is getting stuck in there. \par Sometimes the pain will wake him up at night, he wakes up, drinks water and it resolves. If he takes tums or mylanta this will not happen at night. \par Patient follows up with cardiologist q6 months, last time \par \par Chest/epigastric discomfort likely related to some sort of dysphagia \par Referred to GI for endoscopy to r/o strictures, diverticulum, barrets esophagus\par Pantoprazole 20 mg for GERD \par Patient will also f/u cardio \par EKG nsr

## 2022-04-11 PROBLEM — Z11.59 SCREENING FOR VIRAL DISEASE: Status: ACTIVE | Noted: 2020-07-15

## 2022-05-11 RX ORDER — PANTOPRAZOLE 20 MG/1
20 TABLET, DELAYED RELEASE ORAL DAILY
Qty: 30 | Refills: 0 | Status: ACTIVE | COMMUNITY
Start: 2022-03-01 | End: 1900-01-01

## 2022-05-25 NOTE — HISTORY OF PRESENT ILLNESS
[FreeTextEntry1] : fu trouble swallowing, htn, check lipids, discuss obesity  [de-identified] : Ulises is a 60 yo M w PMHx HTN, HLD \par

## 2022-05-26 ENCOUNTER — APPOINTMENT (OUTPATIENT)
Dept: INTERNAL MEDICINE | Facility: CLINIC | Age: 62
End: 2022-05-26

## 2022-11-03 ENCOUNTER — APPOINTMENT (OUTPATIENT)
Dept: ORTHOPEDIC SURGERY | Facility: CLINIC | Age: 62
End: 2022-11-03

## 2022-11-03 VITALS
DIASTOLIC BLOOD PRESSURE: 89 MMHG | WEIGHT: 241 LBS | HEART RATE: 74 BPM | SYSTOLIC BLOOD PRESSURE: 152 MMHG | BODY MASS INDEX: 34.5 KG/M2 | HEIGHT: 70 IN

## 2022-11-03 DIAGNOSIS — M17.11 UNILATERAL PRIMARY OSTEOARTHRITIS, RIGHT KNEE: ICD-10-CM

## 2022-11-03 DIAGNOSIS — M25.561 PAIN IN RIGHT KNEE: ICD-10-CM

## 2022-11-03 PROCEDURE — 73564 X-RAY EXAM KNEE 4 OR MORE: CPT | Mod: RT

## 2022-11-03 PROCEDURE — 99203 OFFICE O/P NEW LOW 30 MIN: CPT

## 2022-11-03 RX ORDER — MELOXICAM 15 MG/1
15 TABLET ORAL
Qty: 30 | Refills: 0 | Status: ACTIVE | COMMUNITY
Start: 2022-11-03 | End: 1900-01-01

## 2022-11-03 NOTE — HISTORY OF PRESENT ILLNESS
[Worsening] : worsening [2] : a current pain level of 2/10 [Standing] : standing [Intermit.] : ~He/She~ states the symptoms seem to be intermittent [Direct Pressure] : worsened by direct pressure [Walking] : worsened by walking [Knee Flexion] : worsened with knee flexion [Acetaminophen] : relieved by acetaminophen [Exercise Regimen] : relieved by exercise regimen [NSAIDs] : relieved by nonsteroidal anti-inflammatory drugs [de-identified] : 62 year old male with past medical history of hypertension, hyperlipidemia and past surgical history of bilateral meniscotomy (1980s) presents to office for initial evaluation of right knee pain. Patient states he has been experiencing a dull intermittent ache in his right knee for the past month. He states the pain occurs daily and is made worse with descending stairs, walking, and laying in certain positions at night. Takes Tylenol and uses Voltaren gel for the pain with good relief. Has not been to PT but does go to his personal gym regularly. Has not had any injections in the past. Denies any recent injuries or falls, locking, catching or buckling of the knee, numbness/tingling, radiation of pain to hip or back. \par \par Denies history of smoking, drug or alcohol use.  [Hip Movement] : not worsened by hip movement

## 2022-11-03 NOTE — PHYSICAL EXAM
[de-identified] : GENERAL APPEARANCE: Well nourished and hydrated, pleasant, alert, and oriented x 3. Appears their stated age. \par HEENT: Normocephalic, extraocular eye motion intact. Nasal septum midline. Oral cavity clear. External auditory canal clear. \par RESPIRATORY: Breath sounds clear and audible in all lobes. No wheezing, No accessory muscle use.\par CARDIOVASCULAR: No apparent abnormalities. No lower leg edema. No varicosities. Pedal pulses are palpable.\par NEUROLOGIC: Sensation is normal, no muscle weakness in the upper or lower extremities.\par DERMATOLOGIC: No apparent skin lesions, moist, warm, no rash.\par SPINE: Cervical spine appears normal and moves freely; thoracic spine appears normal and moves freely; lumbosacral spine appears normal and moves freely, normal, nontender.\par MUSCULOSKELETAL: Hands, wrists, and elbows are normal and move freely, shoulders are normal and move freely. \par Psychiatric: Oriented to person, place, and time, insight and judgement were intact and the affect was normal. \par Musculoskeletal:. Right knee exam shows mild effusion, ROM is 0-1 20 degrees, no instability, no pain with Glenda, medial joint line tenderness. \par 5/5 motor strength in bilateral lower extremities. Sensory: Intact in bilateral lower extremities. DTRs: Biceps, brachioradialis, triceps, patellar, ankle and plantar 2+ and symmetric bilaterally. Pulses: dorsalis pedis, posterior tibial, femoral, popliteal, and radial 2+ and symmetric bilaterally. \par Constitutional: Alert and in no acute distress, but well-appearing. \par  [de-identified] : 4 views of the right knee obtained the office today show no acute fracture or dislocation.  There is severe medial joint space narrowing bone-on-bone osteoarthritis consistent with Kellgren-Akbar grade 3 changes.

## 2022-11-03 NOTE — DISCUSSION/SUMMARY
[Medication Risks Reviewed] : Medication risks reviewed [Surgical risks reviewed] : Surgical risks reviewed [de-identified] : Patient is a 62-year-old male with right knee osteoarthritis most pronounced in the medial compartment presenting today for initial evaluation.  He is not a try conservative treatment I think is warranted at this time.  I given a prescription for physical therapy.  I recommended meloxicam 15 mg daily as needed for pain.  We discussed the possibility of cortisone injection however he would like to defer that at this time.  I will see him back on an as-needed basis for his right knee.  All questions were asked and answered

## 2023-04-05 ENCOUNTER — APPOINTMENT (OUTPATIENT)
Dept: INTERNAL MEDICINE | Facility: CLINIC | Age: 63
End: 2023-04-05
Payer: COMMERCIAL

## 2023-04-05 ENCOUNTER — NON-APPOINTMENT (OUTPATIENT)
Age: 63
End: 2023-04-05

## 2023-04-05 VITALS
BODY MASS INDEX: 32.21 KG/M2 | HEIGHT: 70 IN | OXYGEN SATURATION: 99 % | WEIGHT: 225 LBS | DIASTOLIC BLOOD PRESSURE: 74 MMHG | RESPIRATION RATE: 15 BRPM | SYSTOLIC BLOOD PRESSURE: 136 MMHG | HEART RATE: 79 BPM | TEMPERATURE: 98 F

## 2023-04-05 DIAGNOSIS — M75.20 BICIPITAL TENDINITIS, UNSPECIFIED SHOULDER: ICD-10-CM

## 2023-04-05 PROCEDURE — 99214 OFFICE O/P EST MOD 30 MIN: CPT

## 2023-04-05 RX ORDER — NAPROXEN 375 MG/1
375 TABLET ORAL
Qty: 14 | Refills: 2 | Status: ACTIVE | COMMUNITY
Start: 2023-04-05 | End: 1900-01-01

## 2023-04-05 NOTE — HISTORY OF PRESENT ILLNESS
[FreeTextEntry8] : Ulises is a 63 yo M w PMHx HTN, HLD \par c/o shoulder R pain. Had to go down on his lateral raises, has been using aleve \par Pain does not worsen when he lays on it.  \par

## 2023-04-05 NOTE — ASSESSMENT
[FreeTextEntry1] : Ulises is a 61 yo M w PMHx HTN, HLD \par c/o shoulder R pain. Had to go down on his lateral raises, has been using aleve \par Pain does not worsen when he lays on it.  \par \par Yergason's test test consistent with bicipital tendonitis \par Provided exercises/stretches \par Naproxen for pain- instructed to take with full stomach and to discontinue any other antiinflammatory medication such as ibuprofen. Warned about increased risk of bleeding.\par Cold therapy to anterior shoulder. \par \par rto for fu

## 2023-04-05 NOTE — PHYSICAL EXAM
[Coordination Grossly Intact] : coordination grossly intact [No Focal Deficits] : no focal deficits [Normal Gait] : normal gait [Normal] : affect was normal and insight and judgment were intact [de-identified] : negative soto/empty can test bilateral, + yergason's test R

## 2023-06-23 RX ORDER — MECLIZINE HYDROCHLORIDE 25 MG/1
25 TABLET ORAL 3 TIMES DAILY
Qty: 15 | Refills: 2 | Status: ACTIVE | COMMUNITY
Start: 2020-06-15 | End: 1900-01-01

## 2024-03-27 NOTE — ED ADULT TRIAGE NOTE - SOURCE OF INFORMATION
Use floxin drops twice a day 5 drops in RIGHT ear.  Take amoxicillin twice a day for 10 days.    Feel better, Mickenzie!    
Patient/Spouse

## 2024-05-28 ENCOUNTER — NON-APPOINTMENT (OUTPATIENT)
Age: 64
End: 2024-05-28

## 2024-06-17 ENCOUNTER — APPOINTMENT (OUTPATIENT)
Dept: INTERNAL MEDICINE | Facility: CLINIC | Age: 64
End: 2024-06-17

## 2024-09-10 ENCOUNTER — TRANSCRIPTION ENCOUNTER (OUTPATIENT)
Age: 64
End: 2024-09-10

## 2024-09-10 ENCOUNTER — EMERGENCY (EMERGENCY)
Facility: HOSPITAL | Age: 64
LOS: 1 days | Discharge: HOME | End: 2024-09-10
Attending: EMERGENCY MEDICINE
Payer: COMMERCIAL

## 2024-09-10 VITALS
HEIGHT: 70 IN | SYSTOLIC BLOOD PRESSURE: 134 MMHG | OXYGEN SATURATION: 96 % | WEIGHT: 233.69 LBS | TEMPERATURE: 98 F | HEART RATE: 76 BPM | RESPIRATION RATE: 16 BRPM | DIASTOLIC BLOOD PRESSURE: 81 MMHG

## 2024-09-10 PROCEDURE — 96372 THER/PROPH/DIAG INJ SC/IM: CPT | Mod: XU

## 2024-09-10 PROCEDURE — 29515 APPLICATION SHORT LEG SPLINT: CPT | Mod: RT

## 2024-09-10 PROCEDURE — 29515 APPLICATION SHORT LEG SPLINT: CPT

## 2024-09-10 PROCEDURE — 73610 X-RAY EXAM OF ANKLE: CPT | Mod: 26,RT

## 2024-09-10 PROCEDURE — 99283 EMERGENCY DEPT VISIT LOW MDM: CPT | Mod: 25

## 2024-09-10 PROCEDURE — 99284 EMERGENCY DEPT VISIT MOD MDM: CPT | Mod: 25

## 2024-09-10 PROCEDURE — 73610 X-RAY EXAM OF ANKLE: CPT

## 2024-09-10 RX ORDER — KETOROLAC TROMETHAMINE 30 MG/ML
30 INJECTION, SOLUTION INTRAMUSCULAR ONCE
Refills: 0 | Status: DISCONTINUED | OUTPATIENT
Start: 2024-09-10 | End: 2024-09-10

## 2024-09-10 RX ADMIN — KETOROLAC TROMETHAMINE 30 MILLIGRAM(S): 30 INJECTION, SOLUTION INTRAMUSCULAR at 11:32

## 2024-09-10 NOTE — ED PROVIDER NOTE - CARDIOVASCULAR NEGATIVE STATEMENT, MLM
"  Problem: Adult Inpatient Plan of Care  Goal: Plan of Care Review  Description: The Plan of Care Review/Shift note should be completed every shift.  The Outcome Evaluation is a brief statement about your assessment that the patient is improving, declining, or no change.  This information will be displayed automatically on your shift  note.  3/23/2024 0559 by Michelle Medrano RN  Outcome: Progressing  3/23/2024 0504 by Michelle Medrano RN  Outcome: Progressing  Goal: Patient-Specific Goal (Individualized)  Description: You can add care plan individualizations to a care plan. Examples of Individualization might be:  \"Parent requests to be called daily at 9am for status\", \"I have a hard time hearing out of my right ear\", or \"Do not touch me to wake me up as it startles  me\".  3/23/2024 0559 by Michelle Medrano RN  Outcome: Progressing  3/23/2024 0504 by Michelle Medrano RN  Outcome: Progressing  Goal: Absence of Hospital-Acquired Illness or Injury  3/23/2024 0559 by Michelle Medrano RN  Outcome: Progressing  3/23/2024 0504 by Michelle Medrano RN  Outcome: Progressing  Intervention: Identify and Manage Fall Risk  Recent Flowsheet Documentation  Taken 3/23/2024 0100 by Michelle Medrano RN  Safety Promotion/Fall Prevention:   increased rounding and observation   increase visualization of patient   nonskid shoes/slippers when out of bed  Intervention: Prevent Skin Injury  Recent Flowsheet Documentation  Taken 3/23/2024 0100 by Michelle Medrano RN  Body Position: position changed independently  Intervention: Prevent Infection  Recent Flowsheet Documentation  Taken 3/23/2024 0100 by Michelle Medrano RN  Infection Prevention:   hand hygiene promoted   rest/sleep promoted   single patient room provided  Goal: Optimal Comfort and Wellbeing  3/23/2024 0559 by Michelle Medrano RN  Outcome: Progressing  3/23/2024 0504 by Michelle Medrano RN  Outcome: Progressing  Goal: Readiness for Transition of Care  3/23/2024 0559 by Michelle Medrano RN  Outcome: " Progressing  3/23/2024 0504 by Michelle Medrano RN  Outcome: Progressing     Problem: Pain Acute  Goal: Optimal Pain Control and Function  3/23/2024 0559 by Michelle Medrano RN  Outcome: Progressing  3/23/2024 0504 by Michelle Medrano RN  Outcome: Progressing  Intervention: Prevent or Manage Pain  Recent Flowsheet Documentation  Taken 3/23/2024 0100 by Michelle Medrano RN  Medication Review/Management: medications reviewed     Problem: Comorbidity Management  Goal: Blood Glucose Levels Within Targeted Range  3/23/2024 0559 by Michelle Medrano RN  Outcome: Progressing  3/23/2024 0504 by Michelle Medrano RN  Outcome: Progressing  Intervention: Monitor and Manage Glycemia  Recent Flowsheet Documentation  Taken 3/23/2024 0100 by Michelle Medrano RN  Medication Review/Management: medications reviewed     Problem: Infection  Goal: Absence of Infection Signs and Symptoms  3/23/2024 0559 by Michelle Medrano RN  Outcome: Progressing  3/23/2024 0504 by Michelle Medrano RN  Outcome: Progressing   Goal Outcome Evaluation:  Pt has been sleeping well overnight, awake with cares. Scheduled tylenol given as ordered, no further complaints of pain. Pt is alert and able to make his needs known.                       no chest pain and no edema.

## 2024-09-10 NOTE — ED ADULT TRIAGE NOTE - CHIEF COMPLAINT QUOTE
Pt is here for an injury to his right leg, pt states he had a pop in his right leg when he was stepping out of his vehicle, pt is able to ambulate but states that it is painful. Pt states weight bearing is difficult and he feels a burning sensation. Denies any falling or any other complaints.

## 2024-09-10 NOTE — ED PROVIDER NOTE - OBJECTIVE STATEMENT
63 y/o M pmhx htn hld, c/o R heel pain starting 2 hours ago. pt states he was seeing a podiatrist for an achilles discomfort and was receiving calf treatment which has been helping. this morning, pt states he stepped out of his car and felt a pop in his leg. He hasn't been able to dorsiflex/plantar flex when walking and has been "Walking flatfooted" since.     pt denies: numbness or tingling, knee pain, trauma

## 2024-09-10 NOTE — ED PROVIDER NOTE - ATTENDING CONTRIBUTION TO CARE
I, Mat Hatch, performed a face to face bedside interview with this patient regarding history of present illness, and completed an independent physical examination. I personally made/approved the management plan and take responsibility for the patient management. I have communicated the patient’s plan of care and disposition with the student  64-year-old male presents with pain to the right ankle.  Patient reports that he has been having pain to his Achilles tendon, seeing physical therapy and chiropractor and massage therapy.  He reports is walking today and felt a pop and a sudden sharp pain in his right Achilles, having difficulty with plantarflexion.  Gen: NAD, well appearing  CV: RRR  Pul: CTA b/l  Neuro: no focal deficits  msk: edema and ttp to the achilles tendon on R  Pt improved, pain controlled, XR neg, splinted in plantar flexion and dc with ortho f/u

## 2024-09-10 NOTE — ED PROVIDER NOTE - CLINICAL SUMMARY MEDICAL DECISION MAKING FREE TEXT BOX
63 y/o M pmhx htn, hld, has a 3 month history of R achilles pain, seeing podiatry and chiropractor getting active release technique therapy, presents w/ R heel pain after hearing a "pop" in his achilles this morning. on exam pt has visible swelling and can't range in plantar flexion. Will give medication for pain control, get xrays, splint, refer to ortho outpatient.

## 2024-09-10 NOTE — ED ADULT NURSE NOTE - OBJECTIVE STATEMENT
pt states he hurt his right foot getting out of a golf cart. states had a problem w/ foot in past. sensation intact, + pulses, toes warm and mobile. able to bear weight but painful

## 2024-09-10 NOTE — ED PROVIDER NOTE - CARE PROVIDERS DIRECT ADDRESSES
,DirectAddress_Unknown ,[email or web address]

## 2024-09-10 NOTE — ED PROVIDER NOTE - CARE PROVIDER_API CALL
Quinn Gómez  Orthopaedic Surgery  [address] [place] [address]  Phone: [phone]  Fax: [phone]  Follow Up Time:    Benito Santos  Orthopaedic Surgery  [address] [place] [id number]  Phone: [phone]  Fax: [phone]  Follow Up Time:

## 2024-09-10 NOTE — ED PROVIDER NOTE - PATIENT PORTAL LINK FT
You can access the FollowMyHealth Patient Portal offered by [facility] by registering at the following website: [email or web address]. By joining [facility]’s FollowMyHealth portal, you will also be able to view your health information using other applications (apps) compatible with our system.

## 2024-09-11 ENCOUNTER — APPOINTMENT (OUTPATIENT)
Dept: ORTHOPEDIC SURGERY | Facility: CLINIC | Age: 64
End: 2024-09-11
Payer: COMMERCIAL

## 2024-09-11 VITALS
WEIGHT: 225 LBS | HEART RATE: 63 BPM | SYSTOLIC BLOOD PRESSURE: 149 MMHG | DIASTOLIC BLOOD PRESSURE: 80 MMHG | BODY MASS INDEX: 32.21 KG/M2 | HEIGHT: 70 IN

## 2024-09-11 DIAGNOSIS — M67.873 OTHER SPECIFIED DISORDERS OF TENDON, RIGHT ANKLE AND FOOT: ICD-10-CM

## 2024-09-11 PROCEDURE — 99203 OFFICE O/P NEW LOW 30 MIN: CPT

## 2024-09-11 RX ORDER — ROSUVASTATIN CALCIUM 20 MG/1
20 TABLET, FILM COATED ORAL
Refills: 0 | Status: ACTIVE | COMMUNITY

## 2024-09-11 NOTE — HISTORY OF PRESENT ILLNESS
[de-identified] : The patient is a 64-year-old gentleman presents with his spouse today for evaluation of right Achilles pain.  He states he has had multiple issues of pain in his Achilles treated by other providers for some time.  He had been doing reasonably well when he stepped out of a golf cart and felt a sharp pain in his posterior heel.  He was seen at the hospital where x-rays were obtained.  There was a suspicion of an Achilles rupture given his history and presenting symptoms.  He was placed into a splint.  He was able to ambulate in the splint and with crutches.  The patient states the pain is made worse with activity and relieved with rest.  [3] : a current pain level of 3/10 [Bending] : worsened by bending [Lifting] : worsened by lifting [Weight Bearing] : worsened by weight bearing [Recumbency] : relieved by recumbency [Rest] : relieved by rest

## 2024-09-11 NOTE — REVIEW OF SYSTEMS
Rodrick Gauthier is requesting to discuss a test result with Dr. Jacinta Rao - did not say what test just that he wanted to peak to you for a few minutes. Please. [Joint Pain] : joint pain [Joint Stiffness] : joint stiffness [Negative] : Heme/Lymph

## 2024-09-11 NOTE — DISCUSSION/SUMMARY
[de-identified] : 64-year-old male with right Achilles pain concerning for possible Achilles tendon rupture.  Due to this we will order an MRI.  When MRI is complete, he will give us a call.  We discussed the relative risks and benefits of surgery versus nonsurgical intervention but this will also depend on the MRI findings.  He was given a cam walker boot today for stabilization and to allow him to ambulate given his Achilles pain.  The patient was given the opportunity to ask questions and all questions were answered to their satisfaction.  Greg Reyes MD Orthopaedic Trauma Surgeon [facility] Orth[facility]edic  Orthopaedic Trauma, [facility]

## 2024-09-11 NOTE — PHYSICAL EXAM
[de-identified] : physical Exam: General: Well appearing, no acute distress, A&O Neurologic: A&Ox3, No focal deficits Head: NCAT without abrasions, lacerations, or ecchymosis to head, face, or scalp Respiratory: Equal chest wall expansion bilaterally, no accessory muscle use Lymphatic: No lymphadenopathy palpated Skin: Warm and dry Psychiatric: Normal mood and affect  Right lower extremity: SILT s/s/sp/dp/t Fires EHL/FHL/GS/TA (plantar flexion intact) 2+ DP/PT pulse brisk capillary refill [de-identified] : Plain films from the emergency department reviewed.  No acute findings are noted.

## 2024-09-13 DIAGNOSIS — E78.5 HYPERLIPIDEMIA, UNSPECIFIED: ICD-10-CM
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
## 2024-09-13 DIAGNOSIS — I10 ESSENTIAL (PRIMARY) HYPERTENSION: ICD-10-CM
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
## 2024-09-13 DIAGNOSIS — M25.571 PAIN IN RIGHT ANKLE AND JOINTS OF RIGHT FOOT: ICD-10-CM

## 2024-09-13 DIAGNOSIS — S90.31XA CONTUSION OF RIGHT FOOT, INITIAL ENCOUNTER: ICD-10-CM

## 2024-11-19 NOTE — ED ADULT TRIAGE NOTE - HEART RATE (BEATS/MIN)
[facility] INFECTIOUS DISEASE PROGRESS NOTE    Faustino Mcgovern Patient Status:  Inpatient    1950 MRN [id number]   Location [facility] PROGRESSIVE STEPDOWN UNIT Attending Wade Olmedo MD   Hosp Day # 4 PCP Judson Nolasco MD       Subjective:  Patient seen and examined.  Interval notes, labs, and chart reviewed.  Awake. Resting in bed, NAD.  S/p VATS yesterday. Pain is controlled.   No fevers. WBC increased from 15k yesterday --> 22k today.   Stable on 2L NC. No fevers.       Allergies:  ALLERGIES:  No Known Allergies    I/O's    Intake/Output Summary (Last 24 hours) at 2024 1531  Last data filed at 2024 0600  Gross per 24 hour   Intake 200 ml   Output 540 ml   Net -340 ml       VITAL SIGNS:     Vital Last Value 24 Hour Range   Temperature 97.4 °F (36.3 °C) (24 1200) Temp  Min: 97.2 °F (36.2 °C)  Max: 98.7 °F (37.1 °C)   Pulse 68 (24 1400) Pulse  Min: 61  Max: 85   Respiratory 19 (24 1400) Resp  Min: 14  Max: 32   Non-Invasive  Blood Pressure 97/52 (24 1400) BP  Min: 97/52  Max: 170/94   Pulse Oximetry 100 % (24 1400) SpO2  Min: 93 %  Max: 100 %   Arterial   Blood Pressure 133/56 (24 1200) Arterial Line BP  Min: 121/33  Max: 181/91      Vital Today Admitted   Weight 90.9 kg (200 lb 6.4 oz) (11/15/24 0155) Weight: 90.9 kg (200 lb 6.4 oz) (11/15/24 0155)   Height N/A Height: 5' 9\" (175.3 cm) (11/15/24 0155)   BMI N/A BMI (Calculated): 29.59 (11/15/24 0155)      Physical Exam:  General: No acute distress. Alert and oriented x 3.  HEENT: Moist mucous membranes. Extraocular muscles are intact.  Neck: No lymphadenopathy.  No JVD. No carotid bruits.  Respiratory: Breathing non-labored, decr BS on L.  L chest tube in place.  Cardiovascular: S1, S2.  Regular rate and rhythm.  +MEKA.  Abdomen: Soft, nontender, nondistended.  Positive bowel sounds.  Musculoskeletal: Full range of motion of all extremities.  No  swelling noted.  Integument: No lesions. No erythema. No open wounds.      Labs   Recent Labs     11/17/24  0649 11/18/24  0332 11/19/24  0444   WBC 16.6* 15.4* 22.8*   RBC 3.64* 3.50* 3.78*   HGB 11.1* 10.8* 11.7*   HCT 34.4* 32.9* 35.4*    333 347   MCV 94.5 94.0 93.7   MCH 30.5 30.9 31.0   MCHC 32.3 32.8 33.1   NRBCRE 0 0 0         Recent Labs     11/17/24  0649 11/18/24  0332 11/19/24 0444   SODIUM 135 136 138   POTASSIUM 3.8 4.1 4.8   MG 2.0 2.0 2.1   CO2 30 32 29   ANIONGAP 9 8 11   GLUCOSE 120* 121* 137*   BUN 35* 32* 36*   CREATININE 1.43* 1.53* 1.50*   CALCIUM 9.2 9.0 9.2   BILIRUBIN 0.7 0.6 0.8   AST 47* 39* 33   GPT 57 50 45   ALKPT 128* 140* 136*   GLOB 5.2* 5.2* 5.6*   AGR 0.3* 0.3* 0.3*      Imaging    XR CHEST AP OR PA   Final Result      Slight decrease in size of small left apicolateral pneumothorax with chest   tube in place. Otherwise unchanged cardiopulmonary appearance as described   above.       Electronically Signed by: FORREST PAUL M.D.    Signed on: 11/19/2024 7:41 AM    Workstation ID: [id number]      XR CHEST AP OR PA   Final Result   Impression:      Left pigtail catheter has been removed and a large bore chest tube has been   placed at the left lung apex.      There is now a small 1.5 cm left lateral pneumothorax present      Persistent hazy opacity throughout the left lung with atelectasis or   effusion at the left base      Normal heart size. There is no mediastinal mass effect      Right lung is clear            Electronically Signed by: HUNTER JOHANSEN MD    Signed on: 11/18/2024 8:33 PM    Workstation ID: [id number]      XR CHEST AP OR PA   Final Result   Impression:    Decreased left pleural effusion and improved lung inflation with persistent   bilateral airspace opacities. Support devices as above.      Electronically Signed by: ADAN STEPHENSON MD    Signed on: 11/18/2024 7:35 AM    Workstation ID: [id number]      CT CHEST WO CONTRAST   Final Result   Interval  removal of the malpositioned left chest tube and placement of a   new left basilar pigtail pleural drain. The basilar component of the   suspected empyema has decreased. The other loculated components are stable   in size. No other short interval change compared to 2 days prior.         Electronically Signed by: ADAN STEPHENSON MD    Signed on: 11/17/2024 12:35 PM    Workstation ID: [id number]      XR CHEST AP OR PA   Final Result   1.   Mild central congestion. Perihilar opacities. Left mid to lower lung   opacities. Moderate left pleural effusion. Left basilar pleural catheter.       Electronically Signed by: LUIGI VAZQUEZ MD    Signed on: 11/16/2024 6:43 AM    Workstation ID: [id number]      XR CHEST AP OR PA   Final Result      Stable moderate to large left pleural effusion containing small foci of   suspected gas. Stable hazy airspace opacities in both lungs with a central   predominance.       Electronically Signed by: FORREST PAUL M.D.    Signed on: 11/15/2024 4:33 PM    Workstation ID: [id number]      CT PLEURAL DRAINAGE INDWELLING CATH   Final Result   Technically successful image guided left-sided chest tube   placement.         PLAN:   1. Maintain chest tube to continue suction -20 cm of water   2. Record output   3. Daily chest x-rays.         Electronically Signed by: AVIS JACKSON MD    Signed on: 11/16/2024 2:55 PM    Workstation ID: [id number]      CT CHEST WO CONTRAST   Final Result   1. Loculated gas and fluid in the left pleural space, suspicious for   empyema. Known malpositioned left chest tube terminating in the left chest   wall.   2. Patchy consolidations and groundglass opacities in the right lung,   compatible with organizing pneumonia, infection and/or inflammation.   3. Cardiomegaly and enlarged main pulmonary artery measuring 3.5 cm, may be   seen with pulmonary hypertension.      Electronically Signed by: ADAN STEPHENSON MD    Signed on: 11/15/2024 12:18 PM     Workstation ID: [id number]      XR CHEST AP OR PA   Final Result   Impression:       1. Sideport of the left chest tube overlaps the left chest wall with   adjacent subcutaneous emphysema. Suggest repositioning. Findings   communicated with MARGOT Jacob via telephone by Dr. Pola Cavanaugh at 7:35   a.m. on 11/15/2024.   2. Enlarged cardiac silhouette, left pleural effusion, and findings of   pulmonary edema.   3. Left retrocardiac atelectasis and/or consolidation.      Electronically Signed by: POLA CAVANAUGH MD    Signed on: 11/15/2024 7:36 AM    Workstation ID: [id number]      XRAY OUTSIDE STUDY   Final Result      US OUTSIDE STUDY   Final Result      CT OUTSIDE STUDY   Final Result      XR CHEST AP OR PA    (Results Pending)     Cultures  Microbiology Results  (Last 10 results in the past 7 days)      Specimen   Gram Smear   Culture Result   Status       11/18/24  1632         No organisms seen.  [P]            Rare Polymorphonuclear cells.  [P]           11/18/24  1615         No organisms seen.  [P]            Few Polymorphonuclear cells.  [P]           11/18/24  1602         No organisms seen.  [P]            Few Polymorphonuclear cells.  [P]           11/15/24  1529         No organisms seen.  [P]            Present Polymorphonuclear cells.  [P]            Slide prepared by [employer].  [P]           11/15/24  1029         No polymorphonuclear cells seen.            No organisms seen.                   [P] - Preliminary Result            ]    11/15/24  Blood cx:  NGTD  11/15/24  L pleural fluid cx: +sphingomonas paucimobilis  11/16/24  C diff PCR: negative    11/18/24  VATS cultures: pending       History of Present Illness:  Faustino Mcgovern is a a(n) 74 year old male.  HTN, HFrEF (EF 25%), CKD (stage III).  Initially presented to [place] d/t chest wall discomfort while lifting weights.  Found w/ bilateral pneumonia and large loculated L pleural effusion and leukocytosis.  S/p Chest tube placement on  11/12/24 w/ removal of 1800 cc - exudative, cx negative reportedly.  Bcx grew H. influenza.  Patient started on empiric Azithromycin and IV Ceftriaxone.  Given no improvement in L pleural effusion despite placement of chest tube, patient transferred to [facility] 11/15/24 for thoracic surgery evaluation.  Admission CXR 11/15/24 showed L chest tube malposition.  S/p CT-guided L chest tube insertion in loculated fluid collection 11/15/24.      ANTIMICROBIAL THERAPY:   IV Zosyn 11/15-      ASSESSMENT:  #  Complicated L lung pneumonia w/ associated loculated effusion  #  L lung empyema   - s/p L sided chest tube at [place] 11/12/24   - s/p CT guided L sided chest tube at [facility] 11/15/24   - s/p VATS 11/18     #  H. influenzae bacteremia (bld cx positive at OSH)   #  Acute leukocytosis - likely secondary to L lung pneumonia  #  HTN, HFrEF (EF 25%)  #  CKD (stage III)    PLAN:  -  continue empiric IV Zosyn for treatment of L lung pneumonia and empyema  -  pleural fluid cultures 11/15 now +sphingomonas paucimobilis, f/u sensitivities   -  noted blood cultures +H. Influenzae at OSH, f/u blood cultures here: 2/2 ngtd   -  s/p VATS 11/18, f/u cultures: ngtd  -  Monitor resp status for improvement: stable on 2L NC   -  Monitor chest tube outputs.  -  Follow labs/vitals.  Monitor wbc trend for improvement.  -  Will need at least 4 weeks IV abx given empyema   -  Case d/w RN.  -  Case d/w ID attending.  -  Will follow.    [facility], [facility]  [phone]  11/19/2024  3:31 PM  ID attending  Patient was seen and examined  Continue empiric IV Zosyn left lower lobe pneumonia, and empyema  Patient with Haemophilus influenzae bacteremia on admission likely pneumonia  Pleural fluid now positive for Sphingomonas paucimobilis   Discussed with pulmonary critical care  Plan as outlined above  Lloyd Wu MD   76

## 2025-05-30 ENCOUNTER — NON-APPOINTMENT (OUTPATIENT)
Age: 65
End: 2025-05-30